# Patient Record
Sex: FEMALE | Race: WHITE | Employment: OTHER | URBAN - METROPOLITAN AREA
[De-identification: names, ages, dates, MRNs, and addresses within clinical notes are randomized per-mention and may not be internally consistent; named-entity substitution may affect disease eponyms.]

---

## 2017-01-01 ENCOUNTER — TELEPHONE (OUTPATIENT)
Dept: INTERNAL MEDICINE CLINIC | Facility: CLINIC | Age: 82
End: 2017-01-01

## 2017-01-01 ENCOUNTER — OFFICE VISIT (OUTPATIENT)
Dept: INTERNAL MEDICINE CLINIC | Facility: CLINIC | Age: 82
End: 2017-01-01

## 2017-01-01 VITALS
SYSTOLIC BLOOD PRESSURE: 156 MMHG | DIASTOLIC BLOOD PRESSURE: 86 MMHG | WEIGHT: 140.63 LBS | HEART RATE: 83 BPM | OXYGEN SATURATION: 94 % | BODY MASS INDEX: 26.55 KG/M2 | TEMPERATURE: 98 F | HEIGHT: 61 IN

## 2017-01-01 DIAGNOSIS — R06.02 SHORTNESS OF BREATH: ICD-10-CM

## 2017-01-01 DIAGNOSIS — R11.0 NAUSEA: ICD-10-CM

## 2017-01-01 DIAGNOSIS — I25.118 CORONARY ARTERY DISEASE WITH OTHER FORM OF ANGINA PECTORIS, UNSPECIFIED VESSEL OR LESION TYPE, UNSPECIFIED WHETHER NATIVE OR TRANSPLANTED HEART (HCC): Primary | ICD-10-CM

## 2017-01-01 DIAGNOSIS — I10 ESSENTIAL HYPERTENSION: ICD-10-CM

## 2017-01-01 DIAGNOSIS — E03.9 ACQUIRED HYPOTHYROIDISM: ICD-10-CM

## 2017-01-01 DIAGNOSIS — E11.9 CONTROLLED TYPE 2 DIABETES MELLITUS WITHOUT COMPLICATION, WITHOUT LONG-TERM CURRENT USE OF INSULIN (HCC): ICD-10-CM

## 2017-01-01 DIAGNOSIS — E78.00 ELEVATED CHOLESTEROL: ICD-10-CM

## 2017-01-01 DIAGNOSIS — I25.118 CORONARY ARTERY DISEASE OF NATIVE HEART WITH STABLE ANGINA PECTORIS, UNSPECIFIED VESSEL OR LESION TYPE (HCC): Primary | ICD-10-CM

## 2017-01-01 PROCEDURE — 93005 ELECTROCARDIOGRAM TRACING: CPT | Performed by: INTERNAL MEDICINE

## 2017-01-01 PROCEDURE — 93000 ELECTROCARDIOGRAM COMPLETE: CPT | Performed by: INTERNAL MEDICINE

## 2017-01-01 PROCEDURE — 99215 OFFICE O/P EST HI 40 MIN: CPT | Performed by: INTERNAL MEDICINE

## 2017-01-01 PROCEDURE — G0463 HOSPITAL OUTPT CLINIC VISIT: HCPCS | Performed by: INTERNAL MEDICINE

## 2017-01-01 RX ORDER — ZOLPIDEM TARTRATE 10 MG/1
TABLET ORAL
Qty: 30 TABLET | Refills: 0 | Status: ON HOLD | OUTPATIENT
Start: 2017-01-01 | End: 2018-01-01

## 2017-01-01 RX ORDER — GLIMEPIRIDE 1 MG/1
TABLET ORAL
Qty: 180 TABLET | Refills: 0 | Status: SHIPPED | OUTPATIENT
Start: 2017-01-01 | End: 2017-01-01

## 2017-01-01 RX ORDER — FUROSEMIDE 20 MG/1
TABLET ORAL
Qty: 180 TABLET | Refills: 1 | Status: ON HOLD | OUTPATIENT
Start: 2017-01-01 | End: 2018-01-01

## 2017-01-01 RX ORDER — GLIMEPIRIDE 1 MG/1
TABLET ORAL
Qty: 180 TABLET | Refills: 0 | Status: ON HOLD | OUTPATIENT
Start: 2017-01-01 | End: 2018-01-01

## 2017-01-01 RX ORDER — FLUTICASONE PROPIONATE AND SALMETEROL 250; 50 UG/1; UG/1
1 POWDER RESPIRATORY (INHALATION) EVERY 12 HOURS SCHEDULED
Qty: 3 EACH | Refills: 1 | Status: SHIPPED | OUTPATIENT
Start: 2017-01-01 | End: 2017-01-01

## 2017-01-01 RX ORDER — CLOPIDOGREL BISULFATE 75 MG/1
TABLET ORAL
Qty: 30 TABLET | Refills: 2 | Status: SHIPPED | OUTPATIENT
Start: 2017-01-01 | End: 2017-01-01

## 2017-01-01 RX ORDER — CLOPIDOGREL BISULFATE 75 MG/1
TABLET ORAL
Qty: 30 TABLET | Refills: 3 | Status: SHIPPED | OUTPATIENT
Start: 2017-01-01 | End: 2017-01-01

## 2017-01-01 RX ORDER — TRAMADOL HYDROCHLORIDE 50 MG/1
TABLET ORAL
Qty: 45 TABLET | Refills: 0 | Status: ON HOLD | OUTPATIENT
Start: 2017-01-01 | End: 2018-01-01

## 2017-01-01 RX ORDER — LEVOTHYROXINE SODIUM 112 UG/1
TABLET ORAL
Qty: 90 TABLET | Refills: 0 | Status: SHIPPED | OUTPATIENT
Start: 2017-01-01 | End: 2017-01-01

## 2017-01-01 RX ORDER — ISOSORBIDE MONONITRATE 30 MG/1
30 TABLET, EXTENDED RELEASE ORAL 2 TIMES DAILY
Qty: 180 TABLET | Refills: 1 | Status: ON HOLD | OUTPATIENT
Start: 2017-01-01 | End: 2018-01-01

## 2017-01-01 RX ORDER — FAMOTIDINE 20 MG/1
20 TABLET ORAL 2 TIMES DAILY
Qty: 60 TABLET | Refills: 3 | Status: ON HOLD | OUTPATIENT
Start: 2017-01-01 | End: 2018-01-01

## 2017-01-01 RX ORDER — LEVOTHYROXINE SODIUM 112 UG/1
TABLET ORAL
Qty: 90 TABLET | Refills: 0 | Status: SHIPPED | OUTPATIENT
Start: 2017-01-01 | End: 2018-01-01

## 2017-01-01 RX ORDER — CLOPIDOGREL BISULFATE 75 MG/1
TABLET ORAL
Qty: 30 TABLET | Refills: 2 | OUTPATIENT
Start: 2017-01-01

## 2017-01-01 RX ORDER — CLOPIDOGREL BISULFATE 75 MG/1
TABLET ORAL
Qty: 30 TABLET | Refills: 0 | Status: ON HOLD | OUTPATIENT
Start: 2017-01-01 | End: 2018-01-01

## 2017-01-05 ENCOUNTER — TELEPHONE (OUTPATIENT)
Dept: INTERNAL MEDICINE CLINIC | Facility: CLINIC | Age: 82
End: 2017-01-05

## 2017-01-05 ENCOUNTER — OFFICE VISIT (OUTPATIENT)
Dept: INTERNAL MEDICINE CLINIC | Facility: CLINIC | Age: 82
End: 2017-01-05

## 2017-01-05 DIAGNOSIS — R06.02 SOB (SHORTNESS OF BREATH) ON EXERTION: ICD-10-CM

## 2017-01-05 DIAGNOSIS — E78.00 ELEVATED CHOLESTEROL: Primary | ICD-10-CM

## 2017-01-05 DIAGNOSIS — E78.2 MIXED HYPERLIPIDEMIA: ICD-10-CM

## 2017-01-05 DIAGNOSIS — I25.10 CORONARY ARTERY DISEASE INVOLVING NATIVE CORONARY ARTERY OF NATIVE HEART, ANGINA PRESENCE UNSPECIFIED: Primary | ICD-10-CM

## 2017-01-05 DIAGNOSIS — E11.9 CONTROLLED TYPE 2 DIABETES MELLITUS WITHOUT COMPLICATION, WITHOUT LONG-TERM CURRENT USE OF INSULIN (HCC): ICD-10-CM

## 2017-01-05 DIAGNOSIS — I10 ESSENTIAL HYPERTENSION WITH GOAL BLOOD PRESSURE LESS THAN 130/80: ICD-10-CM

## 2017-01-05 DIAGNOSIS — E03.9 ACQUIRED HYPOTHYROIDISM: ICD-10-CM

## 2017-01-05 DIAGNOSIS — J30.2 SEASONAL ALLERGIC RHINITIS, UNSPECIFIED ALLERGIC RHINITIS TRIGGER: ICD-10-CM

## 2017-01-05 DIAGNOSIS — E03.9 UNSPECIFIED HYPOTHYROIDISM: ICD-10-CM

## 2017-01-05 PROCEDURE — 99214 OFFICE O/P EST MOD 30 MIN: CPT | Performed by: INTERNAL MEDICINE

## 2017-01-05 PROCEDURE — G0463 HOSPITAL OUTPT CLINIC VISIT: HCPCS | Performed by: INTERNAL MEDICINE

## 2017-01-05 RX ORDER — ZOLPIDEM TARTRATE 10 MG/1
TABLET ORAL
Qty: 30 TABLET | Refills: 0 | Status: SHIPPED | OUTPATIENT
Start: 2017-01-05 | End: 2017-03-16

## 2017-01-05 RX ORDER — TRAMADOL HYDROCHLORIDE 50 MG/1
TABLET ORAL
Qty: 45 TABLET | Refills: 0 | Status: SHIPPED | OUTPATIENT
Start: 2017-01-05 | End: 2017-02-17

## 2017-01-05 RX ORDER — TIZANIDINE 4 MG/1
4 TABLET ORAL EVERY 8 HOURS PRN
Qty: 30 TABLET | Refills: 1 | Status: ON HOLD | OUTPATIENT
Start: 2017-01-05 | End: 2018-01-01

## 2017-01-05 NOTE — PROGRESS NOTES
HPI:    Patient ID: Amparo Cassidy is a 80year old female. HPI   Sees Dr. Sara Gates (cards.)     Diabetes  Patient here for follow up of Diabetes. Has been taking medications regularly. Checks sugars 1 times daily. 2 hrs.  After meals sugars average 130's 04:28 PM   ALT 17 11/30/2015 04:28 PM          Lab Results  Component Value Date/Time   CHOLEST 275* 11/30/2015 04:28 PM   HDL 44 11/30/2015 04:28 PM   TRIG 539* 11/30/2015 04:28 PM   LDL Test Not Performed 11/30/2015 04:28 PM         Review of Systems   C Tab TAKE ONE TABLET BY MOUTH EVERY EVENING Disp: 30 tablet Rfl: 0   lansoprazole 30 MG Oral Capsule Delayed Release Take 1 capsule (30 mg total) by mouth every morning before breakfast. Disp: 30 capsule Rfl: 6   GLIMEPIRIDE 1 MG Oral Tab TAKE 1 TABLET BY M Migdalia    HISTORY:  Past Medical History   Diagnosis Date   • Heart attack ([facility] [address] 90's. CABG X 3. Dr. Clotilde Land is cardiologist. Judd Rubinstein qyr. Appointment 4-15.     • Unspecified essential hypertension    • Other and unspecified hyperlipidemia      Muscle tenderness. Tympanic membrane is not injected, not scarred, not perforated, not erythematous, not retracted and not bulging. Tympanic membrane mobility is normal.  No middle ear effusion. No hemotympanum. No decreased hearing is noted.    Nose: Mucosal maria luisa Musculoskeletal: She exhibits no edema. Lymphadenopathy:        Head (right side): No submental, no submandibular, no tonsillar, no preauricular, no posterior auricular and no occipital adenopathy present.         Head (left side): No submental, no subm different times on different dates. Careful with low sugars. Carry something with you and check sugar if can. Can carry bonny cracker, etc. Decrease carbohydrates. But also, careful with fruits and natural sugars. One serving a day and no more than 1 handf

## 2017-01-06 RX ORDER — CLOPIDOGREL BISULFATE 75 MG/1
75 TABLET ORAL DAILY
Qty: 30 TABLET | Refills: 2 | Status: SHIPPED | OUTPATIENT
Start: 2017-01-06 | End: 2017-01-01

## 2017-01-06 NOTE — TELEPHONE ENCOUNTER
Spoke with daughter Gonzalo Giles, informed may stop Brilinta and start Plavix script sent to pharmacy.

## 2017-01-06 NOTE — TELEPHONE ENCOUNTER
D/W Dr. Coralyn Babinski carediologist. On Hailee Lucian for 9 months OK to change to plavix. Pt. States it's to expensive for brillanta and cannot afford.

## 2017-01-08 VITALS
SYSTOLIC BLOOD PRESSURE: 148 MMHG | OXYGEN SATURATION: 96 % | HEIGHT: 61 IN | HEART RATE: 92 BPM | DIASTOLIC BLOOD PRESSURE: 88 MMHG | WEIGHT: 136 LBS | BODY MASS INDEX: 25.68 KG/M2 | TEMPERATURE: 98 F

## 2017-01-26 ENCOUNTER — PRIOR ORIGINAL RECORDS (OUTPATIENT)
Dept: OTHER | Age: 82
End: 2017-01-26

## 2017-01-26 ENCOUNTER — NURSE ONLY (OUTPATIENT)
Dept: INTERNAL MEDICINE CLINIC | Facility: CLINIC | Age: 82
End: 2017-01-26

## 2017-01-26 DIAGNOSIS — E03.9 ACQUIRED HYPOTHYROIDISM: ICD-10-CM

## 2017-01-26 DIAGNOSIS — E78.00 ELEVATED CHOLESTEROL: ICD-10-CM

## 2017-01-26 LAB
ALBUMIN SERPL BCP-MCNC: 4 G/DL (ref 3.5–4.8)
ALBUMIN/GLOB SERPL: 1.5 {RATIO} (ref 1–2)
ALP SERPL-CCNC: 61 U/L (ref 32–100)
ALT SERPL-CCNC: 18 U/L (ref 14–54)
ANION GAP SERPL CALC-SCNC: 11 MMOL/L (ref 0–18)
AST SERPL-CCNC: 23 U/L (ref 15–41)
BILIRUB SERPL-MCNC: 0.6 MG/DL (ref 0.3–1.2)
BUN SERPL-MCNC: 17 MG/DL (ref 8–20)
BUN/CREAT SERPL: 23 (ref 10–20)
CALCIUM SERPL-MCNC: 9.5 MG/DL (ref 8.5–10.5)
CHLORIDE SERPL-SCNC: 100 MMOL/L (ref 95–110)
CHOLEST SERPL-MCNC: 235 MG/DL (ref 110–200)
CO2 SERPL-SCNC: 27 MMOL/L (ref 22–32)
CREAT SERPL-MCNC: 0.74 MG/DL (ref 0.5–1.5)
GLOBULIN PLAS-MCNC: 2.7 G/DL (ref 2.5–3.7)
GLUCOSE SERPL-MCNC: 88 MG/DL (ref 70–99)
HDLC SERPL-MCNC: 48 MG/DL
LDLC SERPL CALC-MCNC: 115 MG/DL (ref 0–99)
NONHDLC SERPL-MCNC: 187 MG/DL
OSMOLALITY UR CALC.SUM OF ELEC: 287 MOSM/KG (ref 275–295)
POTASSIUM SERPL-SCNC: 4.6 MMOL/L (ref 3.3–5.1)
PROT SERPL-MCNC: 6.7 G/DL (ref 5.9–8.4)
SODIUM SERPL-SCNC: 138 MMOL/L (ref 136–144)
TRIGL SERPL-MCNC: 359 MG/DL (ref 1–149)
TSH SERPL-ACNC: 0.95 UIU/ML (ref 0.34–5.6)

## 2017-01-26 PROCEDURE — 36415 COLL VENOUS BLD VENIPUNCTURE: CPT | Performed by: INTERNAL MEDICINE

## 2017-01-30 NOTE — PROGRESS NOTES
Quick Note:    CMP Normal (electrolytes, sugar, kidney and liver functions),   Lipid (cholesterol) is high trigs. , careful with diet and excercise at least 30 minutes 4 times a week. Check blood in 3 months. Add fish oil 1000 a day.  Add lipitor 20 mg po q

## 2017-02-01 ENCOUNTER — TELEPHONE (OUTPATIENT)
Dept: INTERNAL MEDICINE CLINIC | Facility: CLINIC | Age: 82
End: 2017-02-01

## 2017-02-01 NOTE — TELEPHONE ENCOUNTER
Pt states she is 80years old and has taken cholesterol medications all of her life and does not want to take it they do not work for her. Pt states she started on fish oil yesterday and will work on her diet. Pt refusing zetia.

## 2017-02-08 RX ORDER — TRAMADOL HYDROCHLORIDE 50 MG/1
TABLET ORAL
Qty: 45 TABLET | Refills: 0 | OUTPATIENT
Start: 2017-02-08

## 2017-02-13 RX ORDER — TRAMADOL HYDROCHLORIDE 50 MG/1
TABLET ORAL
Qty: 45 TABLET | Refills: 0 | OUTPATIENT
Start: 2017-02-13

## 2017-02-14 RX ORDER — TRAMADOL HYDROCHLORIDE 50 MG/1
TABLET ORAL
Qty: 45 TABLET | Refills: 0 | OUTPATIENT
Start: 2017-02-14

## 2017-02-17 RX ORDER — TRAMADOL HYDROCHLORIDE 50 MG/1
TABLET ORAL
Qty: 45 TABLET | Refills: 0 | OUTPATIENT
Start: 2017-02-17

## 2017-02-17 RX ORDER — TRAMADOL HYDROCHLORIDE 50 MG/1
TABLET ORAL
Qty: 45 TABLET | Refills: 0 | Status: SHIPPED | OUTPATIENT
Start: 2017-02-17 | End: 2017-03-16

## 2017-02-22 LAB
ALT (SGPT): 18 U/L
AST (SGOT): 23 U/L
BUN: 17 MG/DL
CALCIUM: 9.5 MG/DL
CHLORIDE: 100 MEQ/L
CHOLESTEROL, TOTAL: 235 MG/DL
CREATININE, SERUM: 0.74 MG/DL
GLUCOSE: 88 MG/DL
HDL CHOLESTEROL: 48 MG/DL
LDL CHOLESTEROL: 115 MG/DL
NON-HDL CHOLESTEROL: 187 MG/DL
POTASSIUM, SERUM: 4.6 MEQ/L
SGOT (AST): 23 IU/L
SGPT (ALT): 18 IU/L
SODIUM: 138 MEQ/L
THYROID STIMULATING HORMONE: 0.95 MLU/L
TRIGLYCERIDES: 359 MG/DL

## 2017-02-22 RX ORDER — GLIMEPIRIDE 1 MG/1
TABLET ORAL
Qty: 180 TABLET | Refills: 0 | Status: SHIPPED | OUTPATIENT
Start: 2017-02-22 | End: 2017-01-01

## 2017-02-23 ENCOUNTER — MYAURORA ACCOUNT LINK (OUTPATIENT)
Dept: OTHER | Age: 82
End: 2017-02-23

## 2017-02-23 ENCOUNTER — PRIOR ORIGINAL RECORDS (OUTPATIENT)
Dept: OTHER | Age: 82
End: 2017-02-23

## 2017-02-24 ENCOUNTER — TELEPHONE (OUTPATIENT)
Dept: INTERNAL MEDICINE CLINIC | Facility: CLINIC | Age: 82
End: 2017-02-24

## 2017-02-24 NOTE — TELEPHONE ENCOUNTER
Spoke with Libia Santos (daughter) patient does not want to go see Dr. Dillon Bhakta, if she changes her mind will notify Dr. Renetta Grimes.

## 2017-02-24 NOTE — TELEPHONE ENCOUNTER
Spoke with Dr. Nathan Guzman. Needs to see Dr. Pryor  for F/U on rhythm. But pt. Is palliative care and if she does not want.

## 2017-03-02 ENCOUNTER — TELEPHONE (OUTPATIENT)
Dept: INTERNAL MEDICINE CLINIC | Facility: CLINIC | Age: 82
End: 2017-03-02

## 2017-03-02 RX ORDER — LANCETS 30 GAUGE
EACH MISCELLANEOUS
Qty: 50 EACH | Refills: 6 | Status: SHIPPED | OUTPATIENT
Start: 2017-03-02 | End: 2017-03-09

## 2017-03-02 NOTE — TELEPHONE ENCOUNTER
Current outpatient prescriptions:   •  •  Glucose Blood In Vitro Strip, Dx. 250.02. Checks qweek., Disp: 50 strip, Rfl: 6  •  Lancets Does not apply Misc, Dx. 250.02.  Checks qweek., Disp: 50 each, Rfl: 6  •

## 2017-03-09 ENCOUNTER — TELEPHONE (OUTPATIENT)
Dept: INTERNAL MEDICINE CLINIC | Facility: CLINIC | Age: 82
End: 2017-03-09

## 2017-03-09 RX ORDER — LANCETS 30 GAUGE
EACH MISCELLANEOUS
Qty: 50 EACH | Refills: 6 | Status: SHIPPED | OUTPATIENT
Start: 2017-03-09

## 2017-03-09 RX ORDER — LANCETS 30 GAUGE
EACH MISCELLANEOUS
Qty: 50 EACH | Refills: 6 | Status: SHIPPED | OUTPATIENT
Start: 2017-03-09 | End: 2017-03-09

## 2017-03-13 ENCOUNTER — HOSPITAL ENCOUNTER (EMERGENCY)
Facility: HOSPITAL | Age: 82
Discharge: HOME | End: 2017-03-13
Attending: EMERGENCY MEDICINE
Payer: MEDICARE

## 2017-03-13 ENCOUNTER — APPOINTMENT (OUTPATIENT)
Dept: CT IMAGING | Facility: HOSPITAL | Age: 82
End: 2017-03-13
Attending: EMERGENCY MEDICINE
Payer: MEDICARE

## 2017-03-13 VITALS
WEIGHT: 140 LBS | DIASTOLIC BLOOD PRESSURE: 78 MMHG | TEMPERATURE: 98 F | SYSTOLIC BLOOD PRESSURE: 161 MMHG | HEIGHT: 61 IN | RESPIRATION RATE: 16 BRPM | HEART RATE: 74 BPM | OXYGEN SATURATION: 97 % | BODY MASS INDEX: 26.43 KG/M2

## 2017-03-13 DIAGNOSIS — R10.9 ABDOMINAL PAIN, ACUTE: Primary | ICD-10-CM

## 2017-03-13 LAB
ALBUMIN SERPL BCP-MCNC: 3.6 G/DL (ref 3.5–4.8)
ALP SERPL-CCNC: 67 U/L (ref 32–100)
ALT SERPL-CCNC: 84 U/L (ref 14–54)
ANION GAP SERPL CALC-SCNC: 8 MMOL/L (ref 0–18)
AST SERPL-CCNC: 82 U/L (ref 15–41)
BASOPHILS # BLD: 0 K/UL (ref 0–0.2)
BASOPHILS NFR BLD: 1 %
BILIRUB DIRECT SERPL-MCNC: 0.2 MG/DL (ref 0–0.2)
BILIRUB SERPL-MCNC: 0.9 MG/DL (ref 0.3–1.2)
BILIRUB UR QL: NEGATIVE
BUN SERPL-MCNC: 17 MG/DL (ref 8–20)
BUN/CREAT SERPL: 17 (ref 10–20)
CALCIUM SERPL-MCNC: 9.2 MG/DL (ref 8.5–10.5)
CHLORIDE SERPL-SCNC: 106 MMOL/L (ref 95–110)
CLARITY UR: CLEAR
CO2 SERPL-SCNC: 25 MMOL/L (ref 22–32)
COLOR UR: YELLOW
CREAT SERPL-MCNC: 1 MG/DL (ref 0.5–1.5)
EOSINOPHIL # BLD: 0.1 K/UL (ref 0–0.7)
EOSINOPHIL NFR BLD: 2 %
ERYTHROCYTE [DISTWIDTH] IN BLOOD BY AUTOMATED COUNT: 13.9 % (ref 11–15)
GLUCOSE SERPL-MCNC: 120 MG/DL (ref 70–99)
GLUCOSE UR-MCNC: NEGATIVE MG/DL
HCT VFR BLD AUTO: 39.2 % (ref 35–48)
HGB BLD-MCNC: 12.9 G/DL (ref 12–16)
HYALINE CASTS #/AREA URNS AUTO: 1 /LPF
KETONES UR-MCNC: NEGATIVE MG/DL
LEUKOCYTE ESTERASE UR QL STRIP.AUTO: NEGATIVE
LIPASE SERPL-CCNC: 22 U/L (ref 22–51)
LYMPHOCYTES # BLD: 1.5 K/UL (ref 1–4)
LYMPHOCYTES NFR BLD: 21 %
MCH RBC QN AUTO: 29.2 PG (ref 27–32)
MCHC RBC AUTO-ENTMCNC: 32.9 G/DL (ref 32–37)
MCV RBC AUTO: 88.7 FL (ref 80–100)
MONOCYTES # BLD: 1 K/UL (ref 0–1)
MONOCYTES NFR BLD: 14 %
NEUTROPHILS # BLD AUTO: 4.6 K/UL (ref 1.8–7.7)
NEUTROPHILS NFR BLD: 63 %
NITRITE UR QL STRIP.AUTO: NEGATIVE
OSMOLALITY UR CALC.SUM OF ELEC: 291 MOSM/KG (ref 275–295)
PH UR: 7 [PH] (ref 5–8)
PLATELET # BLD AUTO: 175 K/UL (ref 140–400)
PMV BLD AUTO: 8.8 FL (ref 7.4–10.3)
POTASSIUM SERPL-SCNC: 3.9 MMOL/L (ref 3.3–5.1)
PROT SERPL-MCNC: 6.4 G/DL (ref 5.9–8.4)
PROT UR-MCNC: NEGATIVE MG/DL
RBC # BLD AUTO: 4.42 M/UL (ref 3.7–5.4)
RBC #/AREA URNS AUTO: 2 /HPF
SODIUM SERPL-SCNC: 139 MMOL/L (ref 136–144)
SP GR UR STRIP: 1.01 (ref 1–1.03)
UROBILINOGEN UR STRIP-ACNC: <2
VIT C UR-MCNC: NEGATIVE MG/DL
WBC # BLD AUTO: 7.3 K/UL (ref 4–11)
WBC #/AREA URNS AUTO: 1 /HPF

## 2017-03-13 PROCEDURE — 80048 BASIC METABOLIC PNL TOTAL CA: CPT | Performed by: EMERGENCY MEDICINE

## 2017-03-13 PROCEDURE — 80076 HEPATIC FUNCTION PANEL: CPT | Performed by: EMERGENCY MEDICINE

## 2017-03-13 PROCEDURE — 99284 EMERGENCY DEPT VISIT MOD MDM: CPT

## 2017-03-13 PROCEDURE — 74177 CT ABD & PELVIS W/CONTRAST: CPT

## 2017-03-13 PROCEDURE — 81001 URINALYSIS AUTO W/SCOPE: CPT | Performed by: EMERGENCY MEDICINE

## 2017-03-13 PROCEDURE — 85025 COMPLETE CBC W/AUTO DIFF WBC: CPT | Performed by: EMERGENCY MEDICINE

## 2017-03-13 PROCEDURE — 96360 HYDRATION IV INFUSION INIT: CPT

## 2017-03-13 PROCEDURE — 83690 ASSAY OF LIPASE: CPT | Performed by: EMERGENCY MEDICINE

## 2017-03-13 RX ORDER — SODIUM CHLORIDE 9 MG/ML
INJECTION, SOLUTION INTRAVENOUS ONCE
Status: COMPLETED | OUTPATIENT
Start: 2017-03-13 | End: 2017-03-13

## 2017-03-14 NOTE — ED NOTES
Pt has no c/o abdominal discomfort or nausea at this time, preparing for discharge home. To follow-up with Dr Dewayne Cruz tomorrow, pt has an appointment already scheduled for Thursday.  Instructed to return to ER for any new or worsening symptoms i.e. Fever, bl

## 2017-03-14 NOTE — ED NOTES
Care assumed. Assessment completed. 24 hour hx generalized abd pain now localizing to RLQ with associated anorexia/bloating/nausea.

## 2017-03-14 NOTE — ED PROVIDER NOTES
Patient Seen in: [facility] AND [facility] Emergency Department    History   Patient presents with:  Abdomen/Flank Pain (GI/)    Stated Complaint: sent form IC for addtional testing     HPI    Patient presents with since yesterday morning abdominal pain.   Lindsay (75 mg total) by mouth daily. TiZANidine HCl 4 MG Oral Tab,  Take 1 tablet (4 mg total) by mouth every 8 (eight) hours as needed. Ketoconazole 1 % External Shampoo,  qam for 1 week and qwek maintenance.    Zolpidem Tartrate 10 MG Oral Tab,  TAKE ONE TAB All other systems reviewed and negative except as noted above. PSFH elements reviewed from today and agreed except as otherwise stated in HPI.     Physical Exam       ED Triage Vitals   BP 03/13/17 2003 176/89 mmHg   Pulse 03/13/17 2003 88   Resp 03/ Bacteria Urine Few (*)     All other components within normal limits   BASIC METABOLIC PANEL (8) - Abnormal; Notable for the following:     Glucose 120 (*)     GFR, Non- 52 (*)     All other components within normal limits   HEPATIC FUNCTIO

## 2017-03-16 ENCOUNTER — OFFICE VISIT (OUTPATIENT)
Dept: INTERNAL MEDICINE CLINIC | Facility: CLINIC | Age: 82
End: 2017-03-16

## 2017-03-16 VITALS
WEIGHT: 142.19 LBS | SYSTOLIC BLOOD PRESSURE: 144 MMHG | HEIGHT: 61 IN | DIASTOLIC BLOOD PRESSURE: 82 MMHG | TEMPERATURE: 99 F | BODY MASS INDEX: 26.85 KG/M2 | OXYGEN SATURATION: 94 % | HEART RATE: 88 BPM

## 2017-03-16 DIAGNOSIS — I10 ESSENTIAL HYPERTENSION WITH GOAL BLOOD PRESSURE LESS THAN 130/80: ICD-10-CM

## 2017-03-16 DIAGNOSIS — R10.84 GENERALIZED ABDOMINAL PAIN: ICD-10-CM

## 2017-03-16 DIAGNOSIS — E78.00 ELEVATED CHOLESTEROL: Primary | ICD-10-CM

## 2017-03-16 DIAGNOSIS — E11.9 CONTROLLED TYPE 2 DIABETES MELLITUS WITHOUT COMPLICATION, WITHOUT LONG-TERM CURRENT USE OF INSULIN (HCC): ICD-10-CM

## 2017-03-16 DIAGNOSIS — R31.9 HEMATURIA: ICD-10-CM

## 2017-03-16 DIAGNOSIS — E03.9 ACQUIRED HYPOTHYROIDISM: ICD-10-CM

## 2017-03-16 DIAGNOSIS — I25.10 CORONARY ARTERY DISEASE INVOLVING NATIVE CORONARY ARTERY OF NATIVE HEART, ANGINA PRESENCE UNSPECIFIED: ICD-10-CM

## 2017-03-16 LAB
ALBUMIN SERPL BCP-MCNC: 3.7 G/DL (ref 3.5–4.8)
ALBUMIN/GLOB SERPL: 1.5 {RATIO} (ref 1–2)
ALP SERPL-CCNC: 54 U/L (ref 32–100)
ALT SERPL-CCNC: 55 U/L (ref 14–54)
ANION GAP SERPL CALC-SCNC: 7 MMOL/L (ref 0–18)
APPEARANCE: CLEAR
AST SERPL-CCNC: 30 U/L (ref 15–41)
BILIRUB SERPL-MCNC: 0.8 MG/DL (ref 0.3–1.2)
BILIRUB UR QL: NEGATIVE
BILIRUBIN: NEGATIVE
BUN SERPL-MCNC: 16 MG/DL (ref 8–20)
BUN/CREAT SERPL: 16 (ref 10–20)
CALCIUM SERPL-MCNC: 9 MG/DL (ref 8.5–10.5)
CHLORIDE SERPL-SCNC: 104 MMOL/L (ref 95–110)
CLARITY UR: CLEAR
CO2 SERPL-SCNC: 27 MMOL/L (ref 22–32)
COLOR UR: YELLOW
CREAT SERPL-MCNC: 1 MG/DL (ref 0.5–1.5)
FERRITIN SERPL IA-MCNC: 11 NG/ML (ref 11–307)
GLOBULIN PLAS-MCNC: 2.5 G/DL (ref 2.5–3.7)
GLUCOSE (URINE DIPSTICK): NEGATIVE MG/DL
GLUCOSE SERPL-MCNC: 69 MG/DL (ref 70–99)
GLUCOSE UR-MCNC: NEGATIVE MG/DL
HGB UR QL STRIP.AUTO: NEGATIVE
KETONES (URINE DIPSTICK): NEGATIVE MG/DL
KETONES UR-MCNC: NEGATIVE MG/DL
LEUKOCYTE ESTERASE UR QL STRIP.AUTO: NEGATIVE
LEUKOCYTES: NEGATIVE
MULTISTIX LOT#: NORMAL NUMERIC
NITRITE UR QL STRIP.AUTO: NEGATIVE
NITRITE, URINE: NEGATIVE
OSMOLALITY UR CALC.SUM OF ELEC: 286 MOSM/KG (ref 275–295)
PH UR: 7 [PH] (ref 5–8)
PH, URINE: 7 (ref 4.5–8)
POTASSIUM SERPL-SCNC: 4.2 MMOL/L (ref 3.3–5.1)
PROT SERPL-MCNC: 6.2 G/DL (ref 5.9–8.4)
PROT UR-MCNC: NEGATIVE MG/DL
PROTEIN (URINE DIPSTICK): NEGATIVE MG/DL
SODIUM SERPL-SCNC: 138 MMOL/L (ref 136–144)
SP GR UR STRIP: 1.01 (ref 1–1.03)
SPECIFIC GRAVITY: 1.01 (ref 1–1.03)
URINE-COLOR: YELLOW
UROBILINOGEN UR STRIP-ACNC: <2
UROBILINOGEN,SEMI-QN: 0.2 MG/DL (ref 0–1.9)
VIT C UR-MCNC: NEGATIVE MG/DL

## 2017-03-16 PROCEDURE — 99215 OFFICE O/P EST HI 40 MIN: CPT | Performed by: INTERNAL MEDICINE

## 2017-03-16 PROCEDURE — G0463 HOSPITAL OUTPT CLINIC VISIT: HCPCS | Performed by: INTERNAL MEDICINE

## 2017-03-16 PROCEDURE — 36415 COLL VENOUS BLD VENIPUNCTURE: CPT | Performed by: INTERNAL MEDICINE

## 2017-03-16 PROCEDURE — 81003 URINALYSIS AUTO W/O SCOPE: CPT | Performed by: INTERNAL MEDICINE

## 2017-03-16 RX ORDER — ZOLPIDEM TARTRATE 10 MG/1
TABLET ORAL
Qty: 30 TABLET | Refills: 0 | Status: SHIPPED | OUTPATIENT
Start: 2017-03-16 | End: 2017-01-01

## 2017-03-16 RX ORDER — KETOCONAZOLE 20 MG/ML
SHAMPOO TOPICAL
Refills: 1 | Status: ON HOLD | COMMUNITY
Start: 2017-01-31 | End: 2018-01-01

## 2017-03-16 RX ORDER — ONDANSETRON 4 MG/1
TABLET, ORALLY DISINTEGRATING ORAL
Status: ON HOLD | COMMUNITY
Start: 2017-03-13 | End: 2018-01-01

## 2017-03-16 RX ORDER — TRAMADOL HYDROCHLORIDE 50 MG/1
TABLET ORAL
Qty: 45 TABLET | Refills: 0 | Status: SHIPPED | OUTPATIENT
Start: 2017-03-16 | End: 2017-01-01

## 2017-03-16 RX ORDER — DICYCLOMINE HYDROCHLORIDE 10 MG/1
CAPSULE ORAL
COMMUNITY
Start: 2017-03-13 | End: 2017-03-16

## 2017-03-16 RX ORDER — AMOXICILLIN 500 MG/1
500 CAPSULE ORAL 2 TIMES DAILY
Qty: 20 CAPSULE | Refills: 0 | Status: SHIPPED | OUTPATIENT
Start: 2017-03-16 | End: 2017-01-01

## 2017-03-16 NOTE — PROGRESS NOTES
HPI:    Patient ID: Rosendo Watkins is a 80year old female. Abdominal Pain  This is a new problem. The current episode started in the past 7 days. The onset quality is sudden. The problem occurs intermittently. The problem has been gradually improving.  The 10:27 AM      No results found for: VITD      Eye exam done 2 weeks. Checks feet nightly, no open sores. Hypertension  Patient is here for follow up of hypertension. BP at home: 110/80's. Has been compliant with medications.   Exercise level: somewha stool, melena, hematochezia, abdominal distention, anal bleeding, rectal pain, anorexia and flatus. Endocrine: Negative for cold intolerance, heat intolerance, polydipsia, polyphagia and polyuria.    Genitourinary: Positive for frequency, decreased urine Rfl: 6   Enalapril Maleate 20 MG Oral Tab Take 1 tablet (20 mg total) by mouth nightly. Disp: 90 tablet Rfl: 1   metoprolol Tartrate 25 MG Oral Tab Take 1 tablet (25 mg total) by mouth 2 (two) times daily.  Disp: 180 tablet Rfl: 1   Sotalol HCl 80 MG Oral T CHOLECYSTECTOMY      HEMORRHOIDECTOMY      CATARACT      HYSTERECTOMY      Comment S/P PAULINE and BSO. Bleeding. No abNl Paps.      TONSILLECTOMY      OTHER SURGICAL HISTORY  1991    Comment triple bypass    OTHER SURGICAL HISTORY  2001    Comment pacemaker is midline, oropharynx is clear and moist and mucous membranes are normal. Mucous membranes are not pale, not dry and not cyanotic. She does not have dentures. No oral lesions. No trismus in the jaw. No dental abscesses, uvula swelling or lacerations.  No o preauricular, no posterior auricular and no occipital adenopathy present. She has no cervical adenopathy. Right cervical: No superficial cervical, no deep cervical and no posterior cervical adenopathy present.        Left cervical: No superficial bilaterally. Check eyes every year with dilated eye exam.     Generalized abdominal pain/ Elevated LFT's/ Hematuria. Check urine and blood. Increase fluids. Start antibiotics ASAP and take as directed with food til done.  Take probiotics while on antibiotic

## 2017-03-16 NOTE — PATIENT INSTRUCTIONS
ASSESSMENT/PLAN:   Elevated cholesterol  (primary encounter diagnosis) Check blood in 5-17. Acquired hypothyroidism Stable. Check blood in 5-17. Coronary artery disease involving native coronary artery of native heart, angina presence unspecified. Zolpidem Tartrate 10 MG Oral Tab 30 tablet 0      Sig: TAKE ONE TABLET BY MOUTH EVERY EVENING      amoxicillin 500 MG Oral Cap 20 capsule 0      Sig: Take 1 capsule (500 mg total) by mouth 2 (two) times daily.            Imaging & Referrals:  None

## 2017-03-17 LAB
HAV AB SER QL IA: REACTIVE
HAV IGM SERPL QL IA: NONREACTIVE
HBV CORE AB SERPL QL IA: NONREACTIVE
HBV SURFACE AB SER-ACNC: <3.1 MIU/ML (ref ?–10)
HBV SURFACE AG SERPL QL IA: NONREACTIVE
HBV SURFACE AG SERPL QL IA: NONREACTIVE
HCV AB SERPL QL IA: NONREACTIVE

## 2017-03-17 NOTE — PROGRESS NOTES
Quick Note:    Urine is clear.    CMP Normal (electrolytes, sugar, kidney and liver functions),       ______

## 2017-03-20 NOTE — PROGRESS NOTES
Quick Note:    Normal Ucx. result. Hep. Panel negative except. 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 Now, has antibodies to protect from getting infection again. Old infection maybe from drinking bad water in the past. Call pt to notify.   ______

## 2017-04-19 NOTE — TELEPHONE ENCOUNTER
If you could write order for Home MD will fax to [facility] for home physician.   Needs diagnosis

## 2017-04-19 NOTE — TELEPHONE ENCOUNTER
Patient daughter Hakeem Gr calling states would like to speak to you directly regarding patient care at home Hakeem Gr states it is not always easy getting patient into the office is very stubborn maybe getting a visiting nurse or something else that you would s

## 2017-04-24 ENCOUNTER — MYAURORA ACCOUNT LINK (OUTPATIENT)
Dept: OTHER | Age: 82
End: 2017-04-24

## 2017-04-24 NOTE — PATIENT INSTRUCTIONS
ASSESSMENT/PLAN:   Coronary artery disease of native heart with stable angina pectoris, unspecified vessel or lesion type (hcc)  (primary encounter diagnosis) Add imdur 30 mg every 12 hrs. Hold hospital and ER. NTG as needed.      Essential hypertension Not

## 2017-04-24 NOTE — PROGRESS NOTES
HPI:    Patient ID: Homer Amin is a 80year old female. HPI Comments: SSCP off and on and N. NTG with decrease. Cannot breathe and not sleeping. V X 2 liquids. Decreased appetite. Denies choking. Denies GERD. Denies change in diet. More SOB.  More when hypertension. BP at home: not check. Has been compliant with medications. Exercise level: not active and has been following low salt diet. Weight has been stable.   Wt Readings from Last 3 Encounters:  04/24/17 : 140 lb 9.6 oz (63.776 kg)  03/16/17 : 14 polyphagia and polyuria. Genitourinary: Negative for dysuria, urgency, frequency, hematuria, flank pain, decreased urine volume and difficulty urinating. Musculoskeletal: Negative for myalgias and neck stiffness. Skin: Negative for rash.    Allergic/I tablet Rfl: 0   TiZANidine HCl 4 MG Oral Tab Take 1 tablet (4 mg total) by mouth every 8 (eight) hours as needed.  Disp: 30 tablet Rfl: 1   lansoprazole 30 MG Oral Capsule Delayed Release Take 1 capsule (30 mg total) by mouth every morning before breakfast. of Onset   • Stroke Maternal Aunt       Social History:   Smoking Status: Never Smoker                      Smokeless Status: Never Used                        Alcohol Use: No                 PHYSICAL EXAM:    Physical Exam   Constitutional: She is oriente supple. No thyroid mass and no thyromegaly present. Cardiovascular: Normal rate, regular rhythm, S1 normal, S2 normal, normal heart sounds, intact distal pulses and normal pulses.     Pulses:       Carotid pulses are 2+ on the right side, and 2+ on the le Neurological: She is alert and oriented to person, place, and time. Skin: Skin is warm and dry. She is not diaphoretic. Psychiatric: She has a normal mood and affect. Her behavior is normal. Thought content normal.   Nursing note and vitals reviewed. Aerosol Powder, Breath Activated 3 each 1      Sig: Inhale 1 puff into the lungs every 12 (twelve) hours. furosemide 20 MG Oral Tab 180 tablet 1      Si tab every other day but for next 3 days every day.            Imaging & Referrals:  [facility]

## 2017-04-26 NOTE — TELEPHONE ENCOUNTER
aRy Carrasco called asking what did Md mean by \"comfort care\"? If pt is already on palliative care then the agency needs to be called and informed that pt needs hospice.

## 2017-04-27 NOTE — TELEPHONE ENCOUNTER
Pt. On palliative and worker that worked with pt. Is on maternity leave. Pt. Needs someone to come in sooner.

## 2017-05-01 NOTE — TELEPHONE ENCOUNTER
Left another detailed message regarding need for replacement nurse for palliative care nurse who is on maternity leave with Case Management at [phone]. Left office number for return call.   Dr. Delmis Baxter, daughter is wondering if patient needs to be in hospice

## 2017-05-01 NOTE — TELEPHONE ENCOUNTER
Patient daughter calling is requesting order for a visiting nurse to be evaluated for breathing and hospice.

## 2017-05-01 NOTE — TELEPHONE ENCOUNTER
Is using inhaler. Chest pains on a regular basis. She is not sleeping, not getting any rest, etc.  Schedule patient for 10 a.m. 5/4/17. Would like to be set up for hospice so she can get oxygen.

## 2017-05-02 NOTE — TELEPHONE ENCOUNTER
We had already discussed this that is the reason for the message. We are trying to get someone in sooner.

## 2017-05-02 NOTE — TELEPHONE ENCOUNTER
Calls have been going to social service at hospital, who is not involved with patient. Will find out who home health is.

## 2017-05-02 NOTE — TELEPHONE ENCOUNTER
Calls are going to hospital and they are not seeing patient. Will try to find out what Home health agency is seeing her.

## 2017-05-02 NOTE — TELEPHONE ENCOUNTER
Spoke with Montrell Mendoza at [facility] [employer]. order for hospice faxed to them marked Urgent will try to get someone out today.

## 2017-05-02 NOTE — TELEPHONE ENCOUNTER
Res  hospice nurse [facility] states that pt has been admitted to hospice care today with a DX of Coronary Artery Disease. This is an FYI for you.

## 2017-05-23 NOTE — TELEPHONE ENCOUNTER
Dr. Bethel Espinal, please see message below. Thank you. Jennifer Kimbrough from [facility] called to inform Dr. Bethel Espinal of pts fall. Pt had fall on Friday May 19th which she was just made aware of by pt today.  States pt fell over while getting up to go to the bathroom and

## 2017-06-12 NOTE — TELEPHONE ENCOUNTER
[facility]  [address] .  She dropped off consent form to be send  As soon as possible

## 2017-07-17 NOTE — TELEPHONE ENCOUNTER
Spoke with [employer]. Zofran and phenergan supp. And reglan PO. Ct done. Denies stress. Stopped prevacid not work. More frequent. No headaches. No apples, oranges. Denies urinary c/o. Not eat late at night. Keep head up when sleep.  Not eat and go to bedroom ri

## 2017-08-03 NOTE — PATIENT INSTRUCTIONS
ASSESSMENT/PLAN:   Coronary artery disease involving native coronary artery of native heart, angina presence unspecified  (primary encounter diagnosis) Stable. Essential hypertension with goal blood pressure less than 130/80 ? Control. Occ. Tutu Colin
Provided SBIRT services: Full screen positive. Brief Intervention Performed. Screening results were reviewed with the patient and patient was provided information about healthy guidelines and potential negative consequences associated with level of risk. Motivation and readiness to reduce or stop use was discussed and goals and activities to make changes were suggested/ offered.   Audit Score: 3  DAST Score: 1  Duration = # 10 Minutes

## 2017-08-11 NOTE — TELEPHONE ENCOUNTER
Patient has not seen Dr. Marcia Philip, nausea has gotten better with change of foods/diet. Patient was also put on another medication that is working Pantoprazole given by hospice and is working. Per Iliana Magdaleno (daughter) patient will not be seeing Dr. Marcia Philip.  And davina

## 2017-10-24 ENCOUNTER — PRIOR ORIGINAL RECORDS (OUTPATIENT)
Dept: OTHER | Age: 82
End: 2017-10-24

## 2017-10-30 NOTE — TELEPHONE ENCOUNTER
Rx request for Levothyroxine Sodium 112 mcg, PASSED Hypothyroid Protocol. Rx filled per protocol.     Hypothyroid Medications  Protocol Criteria:  Appointment scheduled in the past 12 months or the next 3 months  TSH resulted in the past 12 months that is n

## 2018-01-01 ENCOUNTER — SNF/IP PROF CHARGE ONLY (OUTPATIENT)
Dept: INTERNAL MEDICINE CLINIC | Facility: CLINIC | Age: 83
End: 2018-01-01

## 2018-01-01 ENCOUNTER — APPOINTMENT (OUTPATIENT)
Dept: CT IMAGING | Facility: HOSPITAL | Age: 83
DRG: 087 | End: 2018-01-01
Payer: MEDICARE

## 2018-01-01 ENCOUNTER — HOSPITAL ENCOUNTER (INPATIENT)
Facility: HOSPITAL | Age: 83
LOS: 1 days | Discharge: HOSPICE | DRG: 087 | End: 2018-01-01
Attending: EMERGENCY MEDICINE | Admitting: INTERNAL MEDICINE
Payer: MEDICARE

## 2018-01-01 ENCOUNTER — APPOINTMENT (OUTPATIENT)
Dept: GENERAL RADIOLOGY | Facility: HOSPITAL | Age: 83
DRG: 087 | End: 2018-01-01
Attending: EMERGENCY MEDICINE
Payer: MEDICARE

## 2018-01-01 ENCOUNTER — TELEPHONE (OUTPATIENT)
Dept: INTERNAL MEDICINE CLINIC | Facility: CLINIC | Age: 83
End: 2018-01-01

## 2018-01-01 ENCOUNTER — HOSPITAL ENCOUNTER (INPATIENT)
Facility: HOSPITAL | Age: 83
LOS: 4 days | DRG: 087 | End: 2018-01-01
Attending: INTERNAL MEDICINE | Admitting: INTERNAL MEDICINE
Payer: OTHER MISCELLANEOUS

## 2018-01-01 VITALS
BODY MASS INDEX: 24 KG/M2 | HEART RATE: 63 BPM | SYSTOLIC BLOOD PRESSURE: 146 MMHG | RESPIRATION RATE: 7 BRPM | OXYGEN SATURATION: 90 % | TEMPERATURE: 100 F | DIASTOLIC BLOOD PRESSURE: 65 MMHG | WEIGHT: 126 LBS

## 2018-01-01 VITALS
HEART RATE: 65 BPM | SYSTOLIC BLOOD PRESSURE: 151 MMHG | RESPIRATION RATE: 18 BRPM | OXYGEN SATURATION: 94 % | DIASTOLIC BLOOD PRESSURE: 76 MMHG | TEMPERATURE: 98 F | WEIGHT: 112 LBS | BODY MASS INDEX: 21.14 KG/M2 | HEIGHT: 61 IN

## 2018-01-01 DIAGNOSIS — Z51.5 HOSPICE CARE: ICD-10-CM

## 2018-01-01 DIAGNOSIS — S06.5X9A ACUTE SUBDURAL HEMATOMA (HCC): Primary | ICD-10-CM

## 2018-01-01 DIAGNOSIS — E11.9 CONTROLLED TYPE 2 DIABETES MELLITUS WITHOUT COMPLICATION, WITHOUT LONG-TERM CURRENT USE OF INSULIN (HCC): ICD-10-CM

## 2018-01-01 DIAGNOSIS — I10 BENIGN ESSENTIAL HTN: ICD-10-CM

## 2018-01-01 DIAGNOSIS — F41.1 ANXIETY STATE: ICD-10-CM

## 2018-01-01 DIAGNOSIS — S00.83XA FACIAL CONTUSION, INITIAL ENCOUNTER: ICD-10-CM

## 2018-01-01 DIAGNOSIS — I10 HYPERTENSION, UNSPECIFIED TYPE: ICD-10-CM

## 2018-01-01 DIAGNOSIS — I25.10 CORONARY ARTERIOSCLEROSIS IN NATIVE ARTERY: ICD-10-CM

## 2018-01-01 DIAGNOSIS — I50.22 CHRONIC SYSTOLIC CONGESTIVE HEART FAILURE (HCC): ICD-10-CM

## 2018-01-01 DIAGNOSIS — S01.511A LIP LACERATION, INITIAL ENCOUNTER: ICD-10-CM

## 2018-01-01 PROCEDURE — 99223 1ST HOSP IP/OBS HIGH 75: CPT | Performed by: INTERNAL MEDICINE

## 2018-01-01 PROCEDURE — 99307 SBSQ NF CARE SF MDM 10: CPT | Performed by: INTERNAL MEDICINE

## 2018-01-01 PROCEDURE — 0CQ1XZZ REPAIR LOWER LIP, EXTERNAL APPROACH: ICD-10-PCS | Performed by: EMERGENCY MEDICINE

## 2018-01-01 PROCEDURE — 96374 THER/PROPH/DIAG INJ IV PUSH: CPT

## 2018-01-01 PROCEDURE — 72125 CT NECK SPINE W/O DYE: CPT

## 2018-01-01 PROCEDURE — 99233 SBSQ HOSP IP/OBS HIGH 50: CPT | Performed by: INTERNAL MEDICINE

## 2018-01-01 PROCEDURE — 99305 1ST NF CARE MODERATE MDM 35: CPT | Performed by: INTERNAL MEDICINE

## 2018-01-01 PROCEDURE — 96375 TX/PRO/DX INJ NEW DRUG ADDON: CPT

## 2018-01-01 PROCEDURE — 99285 EMERGENCY DEPT VISIT HI MDM: CPT

## 2018-01-01 PROCEDURE — 70450 CT HEAD/BRAIN W/O DYE: CPT

## 2018-01-01 PROCEDURE — 12011 RPR F/E/E/N/L/M 2.5 CM/<: CPT

## 2018-01-01 PROCEDURE — 99232 SBSQ HOSP IP/OBS MODERATE 35: CPT | Performed by: INTERNAL MEDICINE

## 2018-01-01 PROCEDURE — 99308 SBSQ NF CARE LOW MDM 20: CPT | Performed by: INTERNAL MEDICINE

## 2018-01-01 PROCEDURE — 73560 X-RAY EXAM OF KNEE 1 OR 2: CPT | Performed by: EMERGENCY MEDICINE

## 2018-01-01 RX ORDER — SODIUM CHLORIDE 9 MG/ML
INJECTION, SOLUTION INTRAVENOUS
Status: DISPENSED
Start: 2018-01-01 | End: 2018-01-01

## 2018-01-01 RX ORDER — METOCLOPRAMIDE HYDROCHLORIDE 5 MG/ML
5 INJECTION INTRAMUSCULAR; INTRAVENOUS EVERY 8 HOURS PRN
Status: DISCONTINUED | OUTPATIENT
Start: 2018-01-01 | End: 2018-03-27

## 2018-01-01 RX ORDER — HYDROMORPHONE HYDROCHLORIDE 1 MG/ML
0.2 INJECTION, SOLUTION INTRAMUSCULAR; INTRAVENOUS; SUBCUTANEOUS CONTINUOUS
Status: DISCONTINUED | OUTPATIENT
Start: 2018-01-01 | End: 2018-01-01 | Stop reason: ALTCHOICE

## 2018-01-01 RX ORDER — LORAZEPAM 2 MG/ML
2 INJECTION INTRAMUSCULAR EVERY 4 HOURS PRN
Status: DISCONTINUED | OUTPATIENT
Start: 2018-01-01 | End: 2018-03-27

## 2018-01-01 RX ORDER — ACETAMINOPHEN 325 MG/1
650 TABLET ORAL EVERY 4 HOURS PRN
Status: DISCONTINUED | OUTPATIENT
Start: 2018-01-01 | End: 2018-03-27

## 2018-01-01 RX ORDER — FUROSEMIDE 10 MG/ML
40 INJECTION INTRAMUSCULAR; INTRAVENOUS EVERY 8 HOURS PRN
Status: DISCONTINUED | OUTPATIENT
Start: 2018-01-01 | End: 2018-03-27

## 2018-01-01 RX ORDER — LORAZEPAM 2 MG/ML
0.5 INJECTION INTRAMUSCULAR EVERY 4 HOURS PRN
Status: DISCONTINUED | OUTPATIENT
Start: 2018-01-01 | End: 2018-03-27

## 2018-01-01 RX ORDER — ACETAMINOPHEN 650 MG/1
650 SUPPOSITORY RECTAL EVERY 4 HOURS PRN
Status: DISCONTINUED | OUTPATIENT
Start: 2018-01-01 | End: 2018-03-27

## 2018-01-01 RX ORDER — LORAZEPAM 2 MG/ML
1 INJECTION INTRAMUSCULAR EVERY 4 HOURS PRN
Status: DISCONTINUED | OUTPATIENT
Start: 2018-01-01 | End: 2018-03-27

## 2018-01-01 RX ORDER — GLYCOPYRROLATE 0.2 MG/ML
0.4 INJECTION, SOLUTION INTRAMUSCULAR; INTRAVENOUS
Status: DISCONTINUED | OUTPATIENT
Start: 2018-01-01 | End: 2018-03-27

## 2018-01-01 RX ORDER — SCOLOPAMINE TRANSDERMAL SYSTEM 1 MG/1
1 PATCH, EXTENDED RELEASE TRANSDERMAL
Status: DISCONTINUED | OUTPATIENT
Start: 2018-01-01 | End: 2018-03-27

## 2018-01-01 RX ORDER — SODIUM PHOSPHATE, DIBASIC AND SODIUM PHOSPHATE, MONOBASIC 7; 19 G/133ML; G/133ML
1 ENEMA RECTAL ONCE AS NEEDED
Status: DISCONTINUED | OUTPATIENT
Start: 2018-01-01 | End: 2018-03-27

## 2018-01-01 RX ORDER — HALOPERIDOL 5 MG/ML
1 INJECTION INTRAMUSCULAR
Status: DISCONTINUED | OUTPATIENT
Start: 2018-01-01 | End: 2018-03-27

## 2018-01-01 RX ORDER — ONDANSETRON 2 MG/ML
INJECTION INTRAMUSCULAR; INTRAVENOUS
Status: COMPLETED
Start: 2018-01-01 | End: 2018-01-01

## 2018-01-01 RX ORDER — HYDROMORPHONE HYDROCHLORIDE 1 MG/ML
0.2 INJECTION, SOLUTION INTRAMUSCULAR; INTRAVENOUS; SUBCUTANEOUS ONCE
Status: COMPLETED | OUTPATIENT
Start: 2018-01-01 | End: 2018-01-01

## 2018-01-01 RX ORDER — LORAZEPAM 2 MG/ML
1 INJECTION INTRAMUSCULAR
Status: DISCONTINUED | OUTPATIENT
Start: 2018-01-01 | End: 2018-01-01

## 2018-01-01 RX ORDER — FUROSEMIDE 40 MG/1
40 TABLET ORAL EVERY 8 HOURS PRN
Status: DISCONTINUED | OUTPATIENT
Start: 2018-01-01 | End: 2018-03-27

## 2018-01-01 RX ORDER — POLYETHYLENE GLYCOL 3350 17 G/17G
17 POWDER, FOR SOLUTION ORAL DAILY PRN
Status: DISCONTINUED | OUTPATIENT
Start: 2018-01-01 | End: 2018-03-27

## 2018-01-01 RX ORDER — SODIUM CHLORIDE 9 MG/ML
INJECTION, SOLUTION INTRAVENOUS
Status: COMPLETED
Start: 2018-01-01 | End: 2018-01-01

## 2018-01-01 RX ORDER — BISACODYL 10 MG
10 SUPPOSITORY, RECTAL RECTAL
Status: DISCONTINUED | OUTPATIENT
Start: 2018-01-01 | End: 2018-01-01

## 2018-01-01 RX ORDER — ONDANSETRON 2 MG/ML
4 INJECTION INTRAMUSCULAR; INTRAVENOUS ONCE
Status: COMPLETED | OUTPATIENT
Start: 2018-01-01 | End: 2018-01-01

## 2018-01-01 RX ORDER — HALOPERIDOL 5 MG/ML
2 INJECTION INTRAMUSCULAR
Status: DISCONTINUED | OUTPATIENT
Start: 2018-01-01 | End: 2018-03-27

## 2018-01-01 RX ORDER — SODIUM CHLORIDE 0.9 % (FLUSH) 0.9 %
10 SYRINGE (ML) INJECTION AS NEEDED
Status: DISCONTINUED | OUTPATIENT
Start: 2018-01-01 | End: 2018-03-27

## 2018-01-01 RX ORDER — ZOLPIDEM TARTRATE 5 MG/1
5 TABLET ORAL NIGHTLY PRN
Status: DISCONTINUED | OUTPATIENT
Start: 2018-01-01 | End: 2018-01-01

## 2018-01-01 RX ORDER — LIDOCAINE 50 MG/G
OINTMENT TOPICAL AS NEEDED
Status: DISCONTINUED | OUTPATIENT
Start: 2018-01-01 | End: 2018-03-27

## 2018-01-01 RX ORDER — ACETAMINOPHEN 650 MG/1
650 SUPPOSITORY RECTAL EVERY 4 HOURS PRN
Status: DISCONTINUED | OUTPATIENT
Start: 2018-01-01 | End: 2018-01-01

## 2018-01-01 RX ORDER — ONDANSETRON 2 MG/ML
4 INJECTION INTRAMUSCULAR; INTRAVENOUS EVERY 6 HOURS PRN
Status: DISCONTINUED | OUTPATIENT
Start: 2018-01-01 | End: 2018-03-27

## 2018-01-01 RX ORDER — ONDANSETRON 4 MG/1
4 TABLET, ORALLY DISINTEGRATING ORAL EVERY 6 HOURS PRN
Status: DISCONTINUED | OUTPATIENT
Start: 2018-01-01 | End: 2018-03-27

## 2018-01-01 RX ORDER — ONDANSETRON 2 MG/ML
4 INJECTION INTRAMUSCULAR; INTRAVENOUS EVERY 6 HOURS PRN
Status: DISCONTINUED | OUTPATIENT
Start: 2018-01-01 | End: 2018-01-01

## 2018-01-01 RX ORDER — BISACODYL 10 MG
10 SUPPOSITORY, RECTAL RECTAL
Status: DISCONTINUED | OUTPATIENT
Start: 2018-01-01 | End: 2018-03-27

## 2018-01-01 RX ORDER — ACETAMINOPHEN 160 MG/5ML
650 SOLUTION ORAL EVERY 4 HOURS PRN
Status: DISCONTINUED | OUTPATIENT
Start: 2018-01-01 | End: 2018-01-01

## 2018-01-01 RX ORDER — LEVOTHYROXINE SODIUM 112 UG/1
TABLET ORAL
Qty: 90 TABLET | Refills: 0 | Status: ON HOLD | OUTPATIENT
Start: 2018-01-01 | End: 2018-01-01

## 2018-02-08 NOTE — TELEPHONE ENCOUNTER
Rx failed protocol. Pt due for TSH.  Please advise  Hypothyroid Medications  Protocol Criteria:  Appointment scheduled in the past 12 months or the next 3 months  TSH resulted in the past 12 months that is normal  Recent Outpatient Visits            9 month

## 2018-02-27 PROBLEM — I25.10 CORONARY ARTERY DISEASE: Status: ACTIVE | Noted: 2018-01-01

## 2018-02-27 PROBLEM — E11.9 CONTROLLED TYPE 2 DIABETES MELLITUS WITHOUT COMPLICATION, WITHOUT LONG-TERM CURRENT USE OF INSULIN (HCC): Status: ACTIVE | Noted: 2018-01-01

## 2018-03-16 NOTE — TELEPHONE ENCOUNTER
Spoke to her , her legs are swollen , she is on lasix 40 bid, explained to her will need  To add  metalazone 2.5  Three times  Week, may need Iv lasix /albumin, she will discuss with family and hospice md

## 2018-03-22 PROBLEM — S00.83XA FACIAL CONTUSION, INITIAL ENCOUNTER: Status: ACTIVE | Noted: 2018-01-01

## 2018-03-22 PROBLEM — R52 PAIN: Status: ACTIVE | Noted: 2018-01-01

## 2018-03-22 PROBLEM — S06.5X9A ACUTE SUBDURAL HEMATOMA (HCC): Status: ACTIVE | Noted: 2018-01-01

## 2018-03-22 PROBLEM — S06.5XAA ACUTE SUBDURAL HEMATOMA (HCC): Status: ACTIVE | Noted: 2018-01-01

## 2018-03-22 PROBLEM — S01.511A LIP LACERATION, INITIAL ENCOUNTER: Status: ACTIVE | Noted: 2018-01-01

## 2018-03-22 NOTE — PROGRESS NOTES
03/22/18 0825   Clinical Encounter Type   Visited With Patient and family together   Routine Visit Introduction   Continue Visiting Yes   Referral From Nurse;Patient   Referral To [facility]-Anointing Patient r

## 2018-03-22 NOTE — ED INITIAL ASSESSMENT (HPI)
Pt brought in by [facility] for fall. Per EMS pt was walking to the bathroom and fell face forward. PT AOx4. Lacerations and bruising to face.  Pt c/o neck pain, C collar applied on arrival.

## 2018-03-22 NOTE — ED NOTES
Per Ems pt fell walking to the bathroom. PT AOx4, multiple contusions and lacerations to face. Pt also c/o neck tenderness.  C Collar applied. -anticoagulants

## 2018-03-22 NOTE — H&P
[facility] HOSP - [facility]    History and Physical    Aristides Sigala Patient Status:  Inpatient    1929 MRN [id number]   Location [facility] 4W/SW/SE Attending Shannan Kruse MD   Hosp Day # 0 PCP Oliverio Browne.  David Rudd MD     Date:  3/22/2018  D Prescriptions Prior to Admission:  Lancets Does not apply Misc Dx. E11.65. Checks qweek. Patient is not currently prescribed insulin. Review of Systems:     Constitutional: Positive for activity change.  Negative for appetite change, chills, diaphore Mouth/Throat: Oropharynx is clear and moist. Mucous membranes are not pale, not dry and not cyanotic. No oral lesions. No trismus in the jaw. No dental abscesses, uvula swelling or lacerations.  No oropharyngeal exudate, posterior oropharyngeal edema, poste Left cervical: No superficial cervical, no deep cervical and no posterior cervical adenopathy present. Right: No supraclavicular adenopathy present. Left: No supraclavicular adenopathy present. Neurological: She is alert.    Skin: Skin CONCLUSION:  1. Trace subdural hematoma overlying the right tentorium cerebelli.   2. Extensive laceration and soft tissue hematoma formation over the nasal bridge and left infraorbital region without evidence of underlying calvarial fracture or coup/contre The patient's daughter states that the patient is under hospice care and she does not want anything further done at this time including lab testing and maxillofacial CT. She was agreeable to suturing the patient's lip and an x-ray of the patient's knee.

## 2018-03-22 NOTE — PROGRESS NOTES
[facility] Pharmacy Note:  Renal Dose Adjustment for Metoclopramide (REGLAN)    Yvette Andrews has been prescribed Metoclopramide (REGLAN) 10 mg every 8 hours as needed for n/v.     CrCl cannot be calculated (Patient's most recent lab result is older than the maximum

## 2018-03-22 NOTE — ED NOTES
Attempted to place IV and collect blood. Pts daughter states that pt is on hospice care and is supposed to only be receiving comfort care and does not feel all these interventions are needed. Dr. Cruz Ast to bedside to discuss.  Pts daughter is contacting so

## 2018-03-22 NOTE — HOSPICE RN NOTE
Admit [facility] to [facility] for pain control. Spoke with Dr Delmis Baxter whom will follow her patient. Ok for Dilaudid drip start at 0.2mg/hr may titirate up to 5mg/hr for comfort. Family at bedside. DNR in chart.

## 2018-03-22 NOTE — CM/SW NOTE
MSW  Visit 3/22/18. MSW in ED with Pt , DTR and SLICK. Pt comfortable at this time, responded well to medication. Waiting on transport at this time. Going up to #441.    Mohsen Shepherd, MSW  [facility]  [phone]

## 2018-03-22 NOTE — ED NOTES
Pt's daughter at bedside, updated on pending arrival of hospice nurse. Pt easily aroused to soft touch and voice. Daughter agreeable for IV insertion to provide pain medication.

## 2018-03-22 NOTE — ED NOTES
Rosibel Ralph RN from residential hospice is here to speak with the patient and family at bedside. The current plan is for admission for pain control. The daughter is still declining any other scans or additional blood work at this time.   The daughter is agreeab

## 2018-03-22 NOTE — PLAN OF CARE
Patient has arrived to floor from Emergency Department. Report received from [facility] prior to arrival.  Drowsy but stable with distinct bruising noted to face. This RN spoke with [facility] RN, Diane Ramachandran.   Plan to transition directly into [facility]

## 2018-03-22 NOTE — ED NOTES
Report called to [facility], pt resting on cart with her eyes closed. Swelling, bruising noted bilateral eyes. When awakened patient denies any pain, awaiting transportation.

## 2018-03-22 NOTE — ED NOTES
Spoke with Rachana Watson (supervisor) will contact daughter to discuss expectations of care.  also contacted to meet with pt and family per family request. Will come when available.     Assisted Dr. Vicky Espinosa with suture repair of

## 2018-03-22 NOTE — HOSPICE RN NOTE
Met with poa/daughter Ashley Mena, agreeable to admit pt in pt hospice for pain control. Collaborated with RN and MD. Requested transfer up to 4th floor. Pt to be admitted to [facility] once she moves up to 4th. Will move to 441.

## 2018-03-22 NOTE — ED PROVIDER NOTES
Patient Seen in: [facility] AND [facility] Emergency Department    History   Patient presents with:  Fall (musculoskeletal, neurologic)    Stated Complaint:     HPI    20-year-old female with history of coronary artery disease post myocardial infarction and stat HPI.  Constitutional and vital signs reviewed. All other systems reviewed and negative except as noted above.     Physical Exam   ED Triage Vitals [03/22/18 0511]  BP: (!) 161/87  Pulse: 78  Resp: 20  Temp: n/a  Temp src: n/a  SpO2: 94 %  O2 Device: No display    ED Course as of Mar 22 0929  ------------------------------------------------------------     CT head  CT cervical spine    IMPRESSION:  CT head:  Left maxillary and paranasal soft tissue laceration with associated soft tissue hematoma  Mildly d any further testing and to admit the patient  under hospice care for pain control and patient comfort only. Discussed with Dr. Salma Ashton, the patient's primary physician.   Admission disposition: 3/22/2018  9:26 AM           Disposition and Plan     Clinical

## 2018-03-22 NOTE — SLP NOTE
Orders rec'd, chart reviewed. Pt reportedly hospice status with RN swallow eval completed. RN to cancel orders for SLP services at this time. Please re-order if/when appropriate for further services as appropriate.     Thank you,  Nancie Falk MA, [facility]

## 2018-03-23 NOTE — HOSPICE RN NOTE
Gip day 2- pps 10-pain control due to fall at her nursing home that resulted in substantial facial injuries.  Dilaudid drip infusing at 0.2mg/hr, per daughter was increased overnight then decreased back to 0.2mg to see if she would be more awake for family

## 2018-03-23 NOTE — CM/SW NOTE
MSW met with Pt,s Dtrs Durene Severin and Madi Perez at [facility] bedside. The pt had been blessed by two priests, and was aware of their presence. She was thankful for the visit. ANY provided supportive presence.   ANY Krishnan  [facility]  [phone]

## 2018-03-23 NOTE — PROGRESS NOTES
[facility]    Progress Note    Abilio Madrigal Patient Status:  Inpatient    1929 MRN [id number]   Location [facility] 4W/SW/SE Attending Dennis Lewis MD   Hosp Day # 1 PCP Js Mejia.  Charlotte Trammell MD     Subjective:     Unable t 03/16/2017   CO2 27 03/16/2017   GLU 69 (L) 03/16/2017   CA 9.0 03/16/2017   ALB 3.7 03/16/2017   ALKPHO 54 03/16/2017   BILT 0.8 03/16/2017   TP 6.2 03/16/2017   AST 30 03/16/2017   ALT 55 (H) 03/16/2017   TSH 0.95 01/26/2017   LIP 22 03/13/2017       Xr spine.  3. Heavy atherosclerosis of the carotid bifurcations bilaterally. 4. Lesser incidental findings as above. A preliminary report was issued by the [address] [place] [address] Radiology teleradiology service. There are no major discrepancies.    Dictated by (CST): Sammy

## 2018-03-24 NOTE — PROGRESS NOTES
[place] [facility]    Progress Note    Levant Milks Patient Status:  Inpatient    1929 MRN [id number]   Location [facility] 4W/SW/SE Attending Cb Posey MD   [facility] Day # 2 PCP Kyrie Gray MD     Subjective:     Unable t TP 6.2 03/16/2017   AST 30 03/16/2017   ALT 55 (H) 03/16/2017   TSH 0.95 01/26/2017   LIP 22 03/13/2017                         Mercy Baxter MD  3/24/2018

## 2018-03-24 NOTE — HOSPICE RN NOTE
GIP DAY 3  Patient comfortable on Dilaudid drip at 0.6mg/hour   No verbal or tactile response during my visit  [address] [place] [address] elevated   NPO  Family decline having grande inserted  Patient having period of apnea  Patient remains inpatient appropriate for pain manageme

## 2018-03-25 NOTE — PROGRESS NOTES
[place] [facility]    Progress Note    Shira Musa Patient Status:  Inpatient    1929 MRN [id number]   Location [facility] 4W/SW/SE Attending Sarah Emery MD   Hosp Day # 3 PCP Teofilo Bond.  Ronnie Buchanan MD     Subjective:     Unable t BILT 0.8 03/16/2017   TP 6.2 03/16/2017   AST 30 03/16/2017   ALT 55 (H) 03/16/2017   TSH 0.95 01/26/2017   LIP 22 03/13/2017                         Mercy Cruz MD  3/25/2018

## 2018-03-25 NOTE — HOSPICE RN NOTE
GIP DAY 4  Dilaudid drip is at 0.6mg/hour for management of her pain control  Patient resting on her left side  Did not respond to verbal or tactile stimuli  Family at bedside  Daughter thought patients knees were mottling a bit  Knees are slightly discolo

## 2018-03-26 NOTE — HOSPICE RN NOTE
Gip- day 5- pt dilaudid infusing now at 1mg/hr. Pt had prn Robinul and haldol in last 24hr. She is extensively mottled. Having periods of apnea and JVD. EOL reviewed with family at bedside. Pt appears comfortable and imminent.

## 2018-03-26 NOTE — PROGRESS NOTES
[place] [facility] - [facility]    Progress Note    Marquez High Patient Status:  Inpatient    1929 MRN [id number]   Location [facility] 4W/SW/SE Attending Krista Webster MD   [facility] Day # 4 PCP Renee Jimenez.  Carin Benitez MD     Subjective:     Unable t

## 2018-03-26 NOTE — CM/SW NOTE
MSW met with family at Pt bedside 3/26/18. The pt appeared calm, comfortable , no s/s of pain or distress. MSW provided emotional support, validation. The family will use [facility], note put on communication page.   Grisel Dhaliwal, ANY  [facility]

## 2018-03-27 ENCOUNTER — TELEPHONE (OUTPATIENT)
Dept: INTERNAL MEDICINE CLINIC | Facility: CLINIC | Age: 83
End: 2018-03-27

## 2018-03-27 NOTE — DISCHARGE SUMMARY
[facility] AND [facility]  Discharge Summary    William Lezama Patient Status:  Inpatient    1929 MRN [id number]   Location [facility] 4W/SW/SE Attending No att. providers found   [facility] Day # 4 PCP Mercy Dykes MD     Date of Admission: 3/22/2018

## 2018-03-27 NOTE — TELEPHONE ENCOUNTER
Manish from [facility] calling death certificate that was received Line A is missing and needs to be completed faxing to [facility] office.  # [phone]

## 2018-03-27 NOTE — SIGNIFICANT EVENT
Pt  at [address]. Resusitation not attempted as pt was DNR.     Time of Death     Family Notified: Y,  Name and Relation: Pavithra Mcgowan (daughter)    MD: Dr. Bola Hooker of [facility] Notified: Y (get Rep Name and Case Number) Madisyn Friend ([id number]

## 2018-12-01 ENCOUNTER — PRIOR ORIGINAL RECORDS (OUTPATIENT)
Dept: HEALTH INFORMATION MANAGEMENT | Facility: OTHER | Age: 83
End: 2018-12-01

## 2019-03-01 VITALS
WEIGHT: 136 LBS | RESPIRATION RATE: 18 BRPM | BODY MASS INDEX: 26.7 KG/M2 | HEART RATE: 89 BPM | DIASTOLIC BLOOD PRESSURE: 68 MMHG | HEIGHT: 60 IN | SYSTOLIC BLOOD PRESSURE: 122 MMHG

## 2019-10-17 ENCOUNTER — TELEPHONE (OUTPATIENT)
Dept: HEALTH INFORMATION MANAGEMENT | Facility: OTHER | Age: 84
End: 2019-10-17

## 2025-03-14 NOTE — TELEPHONE ENCOUNTER
3/14/25  Fausto Alarcon : 2008 Sex: male  Age 16 y.o.      Subjective:  Chief Complaint   Patient presents with    Congestion     Started 2-3 days ago    Cough    Drainage         HPI:     History of Present Illness  The patient is a 16-year-old male who presents for evaluation of cough.    He reports experiencing a persistent cough, accompanied by nasal congestion and rhinorrhea, for the past 3 days. He also mentions a slight shortness of breath but does not experience any fevers, chills, or chest pain. He is not a smoker. He has received both the influenza and COVID-19 vaccines.    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 He has not sought evaluation from an audiologist or ENT specialist for this issue.    SOCIAL HISTORY  He does not smoke.    IMMUNIZATIONS  He received a flu shot in the fall and a COVID-19 vaccine.            ROS:   Unless otherwise stated in this report the patient's positive and negative responses for review of systems for constitutional, eyes, ENT, cardiovascular, respiratory, gastrointestinal, neurological, , musculoskeletal, and integument systems and related systems to the presenting problem are either stated in the history of present illness or were not pertinent or were negative for the symptoms and/or complaints related to the presenting medical problem.  Positives and pertinent negatives as per HPI.  All others reviewed and are negative.      PMH:     Past Medical History:   Diagnosis Date    Healthy infant or child        History reviewed. No pertinent surgical history.    History reviewed. No pertinent family history.    Medications:     Current Outpatient Medications:     oseltamivir (TAMIFLU) 75 MG capsule, Take 1 capsule by mouth 2 times daily for 5 days, Disp: 10 capsule, Rfl: 0    predniSONE (DELTASONE) 10 MG tablet, 3 tabs once daily for 3 days, 2 tabs once daily for 3 days, 1 tab once daily for 3 days, Disp: 18  Per pharmacy request, sent patient script for lancets and strips with notion that patient is not currently using insulin.

## (undated) NOTE — MR AVS SNAPSHOT
[address] [facility] [address]  [phone]               Thank you for choosing us for your health care visit with Tacos Proctor.  Yi Packer MD.  We are glad to serve you and happy to provide you with this summary of your v days. Then, every other day. Check weight in Am and take extra lasix if needed. Elevated cholesterol    Acquired hypothyroidism    Controlled type 2 diabetes mellitus without complication, without long-term current use of insulin (hcc) Good control.  Car Current Medications          This list is accurate as of: 4/24/17  2:36 PM.  Always use your most recent med list.                aspirin 81 MG Chew   Chew by mouth daily.            Clopidogrel Bisulfate 75 MG Tabs   TAKE ONE TABLET BY MOUTH EVERY DAY Take 1 tablet (4 mg total) by mouth every 8 (eight) hours as needed.    Commonly known as:  ZANAFLEX           TraMADol HCl 50 MG Tabs   TAKE ONE TABLET BY MOUTH TWO TIMES A DAY AS NEEDED FOR PAIN   Commonly known as:  ULTRAM           VENTOLIN  (90 active are less likely to develop some chronic diseases than adults who are inactive.      HOW TO GET STARTED: HOW TO STAY MOTIVATED:   Start activities slowly and build up over time Do what you like   Get your heart pumping – brisk walking, biking, swimmin

## (undated) NOTE — MR AVS SNAPSHOT
[address] [facility] [address]  [phone]               Thank you for choosing us for your health care visit with Tacos Proctor.  Yi Packer MD.  We are glad to serve you and happy to provide you with this summary of your v Instructions and Information about Your Health    ASSESSMENT/PLAN:   Elevated cholesterol  (primary encounter diagnosis) Check blood in 5-17. Acquired hypothyroidism Stable. Check blood in 5-17.      Coronary artery disease involving native coronary art Sig: TAKE ONE TABLET BY MOUTH TWO TIMES A DAY AS NEEDED FOR PAIN      Zolpidem Tartrate 10 MG Oral Tab 30 tablet 0      Sig: TAKE ONE TABLET BY MOUTH EVERY EVENING      amoxicillin 500 MG Oral Cap 20 capsule 0      Sig: Take 1 capsule (500 mg total) by taking this medication, and follow the directions you see here. Commonly known as:  NIZORAL           Lancets Misc   Dx. E11.65. Checks qweek. Patient is not currently prescribed insulin.            lansoprazole 30 MG Cpdr   Take 1 capsule (30 mg total) KETONES (URINE DIPSTICK) Negative Negative mg/dL    SPECIFIC GRAVITY 1.015 1.005 - 1.030    OCCULT BLOOD Trace-Lysed Negative    PH, URINE 7.0 4.5 - 8.0    PROTEIN (URINE DIPSTICK) Negative Negative/Trace mg/dL    UROBILINOGEN,SEMI-QN 0.2 0.0 - 1.9 mg/dL

## (undated) NOTE — ED AVS SNAPSHOT
[facility] Emergency Department    [address]     [place] [address]    Phone:  [phone]    Fax:  [phone]           Donavan Shukla   MRN: [id number]    Department:  [facility] Emergency Department   Date of Visit:  3/13/201 You should not take this drug for forty-eight (48) hours after this Imaging study. DO NOT begin taking this drug again until you have contacted the physician who ordered this medication.     Your physician may need to evaluate your kidney function before y a physician, you may call the [place] [address] Physician Referral and Class Registration line at [phone] or find a doctor online by visiting [email or web address].    IF THERE IS ANY CHANGE OR WORSENING OF YOUR CONDITION, Jian PRIMARY CARMELINA Santo - If you are a smoker or have smoked in the last 12 months, we encourage you to explore options for quitting.     - If you have concerns related to behavioral health issues or thoughts of harming yourself, contact [facility] a

## (undated) NOTE — ED AVS SNAPSHOT
[facility] Emergency Department    [address]     [place] [address]    Phone:  [phone]    Fax:  [phone]           Estrella Capps   MRN: [id number]    Department:  [facility] Emergency Department   Date of Visit:  3/13/201 and Class Registration line at [phone] or find a doctor online by visiting [email or web address].    IF THERE IS ANY CHANGE OR WORSENING OF YOUR CONDITION, CALL YOUR PRIMARY CARE PHYSICIAN AT ONCE OR RETURN IMMEDIATELY TO [address] [place] [address].     If

## (undated) NOTE — MR AVS SNAPSHOT
[address] [facility] [place] [address]  [phone]               Thank you for choosing us for your health care visit with [facility].   We are glad to serve you and happy to provide you with this summary of your vi TAKE 1 TABLET BY MOUTH EVERY 12 HOURS WITH MEALS   Commonly known as:  AMARYL           Glucose Blood Strp   Dx. 250.02. Checks qweek. HYDROmorphone HCl 1 MG/ML Soln   Take 0.5 mg by mouth every 4 (four) hours as needed.    Commonly known as:  DIL

## (undated) NOTE — MR AVS SNAPSHOT
[address] [facility] [address]  [phone]               Thank you for choosing us for your health care visit with Bascom Litten.  Marlen Vilchis MD.  We are glad to serve you and happy to provide you with this summary of your v times on different dates. Careful with low sugars. Carry something with you and check sugar if can. Can carry bonny cracker, etc. Decrease carbohydrates. But also, careful with fruits and natural sugars. One serving a day and no more than 1 handful every d Take 1 tablet (20 mg total) by mouth nightly. Commonly known as:  VASOTEC           Fluocinonide 0.05 % Soln   Q12 hrs.            furosemide 20 MG Tabs   1 tab every other day   Commonly known as:  LASIX           glimepiride 1 MG Tabs   TAKE 1 TABLET BY Where to Get Your Medications      These medications were sent to [address] [place] [address] [employer], [address], [address]     Phone:  [phone]    - Ketoconazole 1 % Sham  - TiZANidine HCl 4 MG Tabs      Y